# Patient Record
Sex: FEMALE | Race: WHITE | NOT HISPANIC OR LATINO | Employment: OTHER | URBAN - METROPOLITAN AREA
[De-identification: names, ages, dates, MRNs, and addresses within clinical notes are randomized per-mention and may not be internally consistent; named-entity substitution may affect disease eponyms.]

---

## 2017-10-04 ENCOUNTER — OFFICE VISIT (OUTPATIENT)
Dept: URGENT CARE | Facility: CLINIC | Age: 72
End: 2017-10-04
Payer: MEDICARE

## 2017-10-04 VITALS
BODY MASS INDEX: 23.05 KG/M2 | HEART RATE: 63 BPM | TEMPERATURE: 99 F | WEIGHT: 135 LBS | HEIGHT: 64 IN | OXYGEN SATURATION: 96 % | SYSTOLIC BLOOD PRESSURE: 136 MMHG | RESPIRATION RATE: 18 BRPM | DIASTOLIC BLOOD PRESSURE: 78 MMHG

## 2017-10-04 DIAGNOSIS — S66.912A HAND STRAIN, LEFT, INITIAL ENCOUNTER: ICD-10-CM

## 2017-10-04 DIAGNOSIS — R52 PAIN: Primary | ICD-10-CM

## 2017-10-04 DIAGNOSIS — I10 ESSENTIAL HYPERTENSION: ICD-10-CM
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
## 2017-10-04 DIAGNOSIS — M79.642 LEFT HAND PAIN: ICD-10-CM

## 2017-10-04 PROCEDURE — 99203 OFFICE O/P NEW LOW 30 MIN: CPT | Mod: S$GLB,,, | Performed by: NURSE PRACTITIONER

## 2017-10-04 RX ORDER — PANTOPRAZOLE SODIUM 20 MG/1
20 TABLET, DELAYED RELEASE ORAL DAILY
COMMUNITY

## 2017-10-04 RX ORDER — NAPROXEN 500 MG/1
500 TABLET ORAL 2 TIMES DAILY WITH MEALS
Qty: 10 TABLET | Refills: 0 | Status: SHIPPED | OUTPATIENT
Start: 2017-10-04 | End: 2017-10-09

## 2017-10-04 RX ORDER — PRAVASTATIN SODIUM 10 MG/1
10 TABLET ORAL DAILY
COMMUNITY

## 2017-10-04 RX ORDER — HYDROCHLOROTHIAZIDE 12.5 MG/1
12.5 TABLET ORAL DAILY
COMMUNITY

## 2017-10-04 RX ORDER — AMLODIPINE BESYLATE 10 MG/1
10 TABLET ORAL DAILY
COMMUNITY

## 2017-10-04 RX ORDER — ASPIRIN 325 MG
325 TABLET ORAL DAILY
COMMUNITY

## 2017-10-04 RX ORDER — METOPROLOL TARTRATE 100 MG/1
25 TABLET ORAL 2 TIMES DAILY
COMMUNITY

## 2017-10-04 NOTE — PROGRESS NOTES
"Subjective:       Patient ID: Sapphire Olson is a 71 y.o. female.    Vitals:  height is 5' 4" (1.626 m) and weight is 61.2 kg (135 lb).     Chief Complaint: Hand Pain    This is a 71 y.o. female with Past Medical History:  No date: GERD (gastroesophageal reflux disease)  No date: Hypertension  No date: Hyperthyroidism   who presents today with a chief complaint of Left Hand Injury. Reports falling and catching herself on Sunday. Has been sore to the lateral part of the hand since then with a sensation of pulling when grasping objects.       Hand Pain    The incident occurred 3 to 5 days ago. The incident occurred at home. The injury mechanism was a fall. The pain is present in the left hand. The quality of the pain is described as burning and cramping. The pain radiates to the left hand. The pain is at a severity of 5/10. The pain is mild. The pain has been constant since the incident. Nothing aggravates the symptoms. She has tried nothing for the symptoms. The treatment provided no relief.     Review of Systems   Musculoskeletal: Positive for falls and joint pain. Negative for back pain, joint swelling and stiffness.   Psychiatric/Behavioral: The patient is not nervous/anxious.        Objective:      Physical Exam   Constitutional: She is oriented to person, place, and time. She appears well-developed and well-nourished.   HENT:   Head: Normocephalic and atraumatic.   Musculoskeletal: She exhibits tenderness (good rom, no wrist abn, mild tenderness to the lateral part of the right hand with palpation. no bruisng noted. ). She exhibits no edema.   Neurological: She is alert and oriented to person, place, and time.   Skin: Skin is warm and dry.   Psychiatric: She has a normal mood and affect. Her behavior is normal. Judgment and thought content normal.   Nursing note and vitals reviewed.      Assessment:       1. Pain    2. Fall, initial encounter    3. Left hand pain    4. Hand strain, left, initial encounter    5. " Essential hypertension        Plan:         1. Pain  Xray clear.   - X-Ray Hand Complete Left; Future    2. Fall, initial encounter  3. Left hand pain  4. Hand strain, left, initial encounter  Advised on RICE therapy with Nsaids. BP well controlled. Advised to limit use.     5. HTN  See above.

## 2017-10-04 NOTE — PATIENT INSTRUCTIONS
Apply a compressive ACE bandage. Rest and elevate the affected painful area.  Apply cold compresses intermittently as needed.  As pain recedes, begin normal activities slowly as tolerated.  Call if symptoms persist.

## 2017-12-21 ENCOUNTER — OFFICE VISIT (OUTPATIENT)
Dept: URGENT CARE | Facility: CLINIC | Age: 72
End: 2017-12-21
Payer: MEDICARE

## 2017-12-21 VITALS
HEIGHT: 64 IN | BODY MASS INDEX: 22.71 KG/M2 | HEART RATE: 83 BPM | OXYGEN SATURATION: 95 % | RESPIRATION RATE: 20 BRPM | SYSTOLIC BLOOD PRESSURE: 135 MMHG | TEMPERATURE: 98 F | WEIGHT: 133 LBS | DIASTOLIC BLOOD PRESSURE: 87 MMHG

## 2017-12-21 DIAGNOSIS — S80.01XA CONTUSION OF RIGHT KNEE, INITIAL ENCOUNTER: Primary | ICD-10-CM

## 2017-12-21 DIAGNOSIS — M25.561 ACUTE PAIN OF RIGHT KNEE: ICD-10-CM

## 2017-12-21 PROCEDURE — 99214 OFFICE O/P EST MOD 30 MIN: CPT | Mod: S$GLB,,, | Performed by: INTERNAL MEDICINE

## 2017-12-21 NOTE — PROGRESS NOTES
"Subjective:       Patient ID: Sapphire Olson is a 72 y.o. female.    Vitals:  height is 5' 4" (1.626 m) and weight is 60.3 kg (133 lb). Her oral temperature is 97.8 °F (36.6 °C). Her blood pressure is 135/87 and her pulse is 83. Her respiration is 20 and oxygen saturation is 95%.     Chief Complaint: Knee Pain (right)    Fell at [facility] on rice Bruise to knee no gait disturbance with observation NO LOC No head trauma No absolutely no neck or Thoracic or Lumbar back strain or pain whatsoever FROM of all    states I would like an XRAY "before I settle"      Knee Pain    The incident occurred 12 to 24 hours ago. The injury mechanism was a fall. The pain is present in the right knee. The quality of the pain is described as aching. The pain is at a severity of 5/10. The pain is mild. Pertinent negatives include no numbness. The symptoms are aggravated by movement. The treatment provided mild relief.     Review of Systems   Constitution: Negative for weakness and malaise/fatigue.   HENT: Negative for nosebleeds.    Cardiovascular: Negative for chest pain and syncope.   Respiratory: Negative for shortness of breath.    Musculoskeletal: Positive for joint pain and joint swelling. Negative for back pain and neck pain.   Gastrointestinal: Negative for abdominal pain.   Genitourinary: Negative for hematuria.   Neurological: Negative for dizziness and numbness.       Objective:      Physical Exam   Constitutional: She is oriented to person, place, and time. She appears well-developed and well-nourished. She is cooperative.  Non-toxic appearance. She does not appear ill. No distress.   HENT:   Head: Normocephalic and atraumatic.   Right Ear: Hearing, external ear and ear canal normal. Tympanic membrane is not injected.   Left Ear: Hearing, external ear and ear canal normal. Tympanic membrane is not injected.   Nose: Nose normal. No mucosal edema, rhinorrhea or nasal deformity. No epistaxis. Right sinus exhibits no " maxillary sinus tenderness and no frontal sinus tenderness. Left sinus exhibits no maxillary sinus tenderness and no frontal sinus tenderness.   Mouth/Throat: Uvula is midline, oropharynx is clear and moist and mucous membranes are normal. No trismus in the jaw. Normal dentition. No uvula swelling. No posterior oropharyngeal erythema.   Eyes: Conjunctivae and lids are normal. No scleral icterus.   Sclera clear bilat   Neck: Trachea normal, full passive range of motion without pain and phonation normal. Neck supple.   Cardiovascular: Normal rate, regular rhythm, normal heart sounds, intact distal pulses and normal pulses.    Pulmonary/Chest: Effort normal and breath sounds normal. No respiratory distress.   Abdominal: Soft. Normal appearance and bowel sounds are normal. She exhibits no distension. There is no tenderness.   Musculoskeletal: She exhibits no edema or deformity.        Right knee: She exhibits normal range of motion (bruising ), no deformity and no erythema. Tenderness found. No medial joint line (above patella ), no lateral joint line, no MCL and no LCL tenderness noted.        Legs:  Neurological: She is alert and oriented to person, place, and time. She exhibits normal muscle tone. Coordination normal.   Skin: Skin is warm, dry and intact. She is not diaphoretic. No pallor.   Psychiatric: She has a normal mood and affect. Her speech is normal and behavior is normal. Judgment and thought content normal. Cognition and memory are normal.   Nursing note and vitals reviewed.    negative xray for fracture or dislocation   Assessment:       1. Contusion of right knee, initial encounter    2. Acute pain of right knee        Plan:         Contusion of right knee, initial encounter  -     X-Ray Knee 3 View Right; Future; Expected date: 12/21/2017    Acute pain of right knee      Take meds motrin

## 2017-12-21 NOTE — PATIENT INSTRUCTIONS
Understanding Photodynamic Therapy for Age-Related Macular Degeneration (AMD)  Photodynamic therapy is a treatment for the eyes. It uses lasers and a special medicine that works when exposed to a certain type of light. It is done to treat age-related macular degeneration (AMD). AMD is a condition that can lead to loss of eyesight. Photodynamic therapy uses a light-sensitive medicine and a laser to seal off abnormal blood vessels in your eye. It cant restore eyesight that you have already lost. But it may slow down the damage to your central vision.  What is AMD?  The retina is a layer of cells in the back of your eye. It converts light into electrical signals. Your retina then sends these signals to your brain. The macula is the sensitive center part of your retina. This area gives you detailed vision in the middle of your visual field. AMD damages your macula. The macula may become thinner and deposits of material can develop beneath it. Blood vessels may start growing beneath your macula. This can cause fluid to leak beneath your macula. This extra fluid can lead to eyesight loss.  AMD has 2 types: dry type and wet type. Abnormal blood vessel growth is present in only the wet type. AMD is a common cause of severe eyesight loss in older adults. In rare cases, it can result in total blindness. Because AMD affects the macula, you may still have your side (peripheral) vision. But you may have a slow or sudden loss of central vision.  Why photodynamic therapy for AMD is done  Photodynamic therapy is 1 type of treatment for AMD. It is a choice only for certain people with wet type AMD. It may be advised if your eyesight loss comes on slowly over time, instead of suddenly. The treatment is used less often now that there is medicine to slow down the growth of abnormal blood vessels. But your healthcare provider may advise the therapy in addition to medicine.  How photodynamic therapy for AMD is done   You will get an  injection of a light-sensitive medicine. Eye drops will be used to dilate your pupil. It will stay dilated for several hours after the procedure. A special type of contact lens will be put into the affected eye. This lens helps focus a beam of laser light on the retina using a tool called a slit lamp. Your eye care doctor will shine a laser in the exact spot in your eye. This will activate the light-sensitive medicine. It will form blood clots in the abnormal vessels beneath your macula. This seals off the abnormal blood vessels.  Risks of photodynamic therapy  All procedures have risks. The risks of this procedure include:  · Temporary loss of visual sharpness, which in rare cases can be severe  · A new blind spot  · Reactions where you had the light-activated medicine injected  · Back pain because of injection of the medicine  · Photosensitivity reactions like sunburn, if exposed to sunlight right after the procedure  · Only short-term relief from AMD symptoms, because the blood vessels open again  Your risks may differ according to your age, your general health, and the type of your AMD. Ask your healthcare provider which risks apply most to you.   Date Last Reviewed: 2/1/2017  © 2218-7644 [employer]. [address] [place] [address]. All rights reserved. This information is not intended as a substitute for professional medical care. Always follow your healthcare professional's instructions.    Please return here or go to the Emergency Department for any concerns or worsening of condition.  If you were prescribed antibiotics, please take them to completion.  If you were prescribed a narcotic medication, do not drive or operate heavy equipment or machinery while taking these medications.  Please follow up with your primary care doctor or specialist as needed.    If you  smoke, please stop smoking.  Apply a compressive ACE bandage. Rest and elevate the affected painful area.  Apply cold  compresses intermittently as needed.  As pain recedes, begin normal activities slowly as tolerated.  Call if symptoms persist.  Motrin and ice

## 2017-12-24 ENCOUNTER — TELEPHONE (OUTPATIENT)
Dept: URGENT CARE | Facility: CLINIC | Age: 72
End: 2017-12-24